# Patient Record
Sex: MALE | Race: WHITE | NOT HISPANIC OR LATINO | URBAN - METROPOLITAN AREA
[De-identification: names, ages, dates, MRNs, and addresses within clinical notes are randomized per-mention and may not be internally consistent; named-entity substitution may affect disease eponyms.]

---

## 2018-10-16 ENCOUNTER — HOSPITAL ENCOUNTER (EMERGENCY)
Facility: HOSPITAL | Age: 48
Discharge: HOME | End: 2018-10-16
Admitting: EMERGENCY MEDICINE

## 2018-10-16 ENCOUNTER — APPOINTMENT (OUTPATIENT)
Dept: CT IMAGING | Facility: HOSPITAL | Age: 48
End: 2018-10-16

## 2018-10-16 VITALS
SYSTOLIC BLOOD PRESSURE: 160 MMHG | BODY MASS INDEX: 41.99 KG/M2 | RESPIRATION RATE: 16 BRPM | WEIGHT: 252 LBS | HEIGHT: 65 IN | DIASTOLIC BLOOD PRESSURE: 102 MMHG | OXYGEN SATURATION: 98 % | HEART RATE: 75 BPM | TEMPERATURE: 97.9 F

## 2018-10-16 DIAGNOSIS — S09.90XA CLOSED HEAD INJURY, INITIAL ENCOUNTER: ICD-10-CM

## 2018-10-16 DIAGNOSIS — S00.01XA ABRASION, SCALP W/O INFECTION: ICD-10-CM
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
## 2018-10-16 PROCEDURE — 70450 CT HEAD/BRAIN W/O DYE: CPT

## 2018-10-16 PROCEDURE — 90715 TDAP VACCINE 7 YRS/> IM: CPT | Performed by: NURSE PRACTITIONER

## 2018-10-16 PROCEDURE — 90471 IMMUNIZATION ADMIN: CPT | Performed by: NURSE PRACTITIONER

## 2018-10-16 PROCEDURE — 25010000002 TDAP 5-2.5-18.5 LF-MCG/0.5 SUSPENSION: Performed by: NURSE PRACTITIONER

## 2018-10-16 PROCEDURE — 99284 EMERGENCY DEPT VISIT MOD MDM: CPT

## 2018-10-16 RX ORDER — ATENOLOL 25 MG/1
25 TABLET ORAL DAILY
COMMUNITY

## 2018-10-16 RX ORDER — PRAVASTATIN SODIUM 40 MG
40 TABLET ORAL DAILY
COMMUNITY

## 2018-10-16 RX ORDER — LISINOPRIL 20 MG/1
20 TABLET ORAL DAILY
COMMUNITY

## 2018-10-16 RX ADMIN — TETANUS TOXOID, REDUCED DIPHTHERIA TOXOID AND ACELLULAR PERTUSSIS VACCINE, ADSORBED 0.5 ML: 5; 2.5; 8; 8; 2.5 SUSPENSION INTRAMUSCULAR at 15:29

## 2018-10-16 NOTE — DISCHARGE INSTRUCTIONS
Home to rest  Drink plenty of fluids  Keep the wound clean and dry, apply bacitracin   Return if worse or new concerns   Continue care with your primary care physician and have your blood pressure regularly checked and managed. Normal blood pressure is 120/80.

## 2018-10-16 NOTE — ED TRIAGE NOTES
Pt to ED for fall backwards while unloading a truck, hit head on concrete. No LOC. Pt c/o dizziness and wound to occiptal area.

## 2018-10-16 NOTE — ED NOTES
Pts wounds cleansed with saline, antibiotic ointment and dressing applied. Requested Tdap requested form pharmacy.     Bethany Magana RN  10/16/18 7723

## 2018-10-16 NOTE — ED PROVIDER NOTES
EMERGENCY DEPARTMENT ENCOUNTER    CHIEF COMPLAINT  Chief Complaint: head injury  History given by: patient  History limited by: none  Room Number: 41/41  PMD: Provider, No Known      HPI:  Pt is a 48 y.o. male who presents complaining of head injury that occurred after a fall PTA.. Pt is also experiencing neck pain. Pt states that he is a  and was working on a strap when he fell backwards and hit his head on the rim of another truck. Pt denies LOC and is on 81 ASA. Pt is unaware TDAP status.     Duration:  Just PTA  Onset: sudden  Timing: constant  Location: posterior head  Radiation: none  Quality: pain  Intensity/Severity: moderate  Progression: unchanged  Associated Symptoms: neck pain  Aggravating Factors: none  Alleviating Factors: none  Previous Episodes: none  Treatment before arrival: Pt is on 81 ASA.     PAST MEDICAL HISTORY  Active Ambulatory Problems     Diagnosis Date Noted   • No Active Ambulatory Problems     Resolved Ambulatory Problems     Diagnosis Date Noted   • No Resolved Ambulatory Problems     Past Medical History:   Diagnosis Date   • Diabetes mellitus (CMS/HCC)    • Hyperlipidemia    • Hypertension        PAST SURGICAL HISTORY  Past Surgical History:   Procedure Laterality Date   • ADENOIDECTOMY     • CHOLECYSTECTOMY     • TONSILLECTOMY     • UVULECTOMY         FAMILY HISTORY  History reviewed. No pertinent family history.    SOCIAL HISTORY  Social History     Social History   • Marital status:      Spouse name: N/A   • Number of children: N/A   • Years of education: N/A     Occupational History   • Not on file.     Social History Main Topics   • Smoking status: Former Smoker     Years: 20.00   • Smokeless tobacco: Never Used   • Alcohol use No   • Drug use: Unknown   • Sexual activity: Not on file     Other Topics Concern   • Not on file     Social History Narrative   • No narrative on file       ALLERGIES  Patient has no known allergies.    REVIEW OF SYSTEMS  Review of  Systems   Constitutional: Negative for activity change, appetite change and fever.   HENT: Negative for congestion and sore throat.    Eyes: Negative.    Respiratory: Negative for cough and shortness of breath.    Cardiovascular: Negative for chest pain and leg swelling.   Gastrointestinal: Negative for abdominal pain, diarrhea and vomiting.   Endocrine: Negative.    Genitourinary: Negative for decreased urine volume and dysuria.   Musculoskeletal: Positive for neck pain.   Skin: Positive for wound (laceration-head injury). Negative for rash.   Allergic/Immunologic: Negative.    Neurological: Negative for weakness, numbness and headaches.        LOC (-)   Hematological: Negative.    Psychiatric/Behavioral: Negative.    All other systems reviewed and are negative.      PHYSICAL EXAM  ED Triage Vitals [10/16/18 1413]   Temp Heart Rate Resp BP SpO2   97.9 °F (36.6 °C) 65 18 (!) 160/102 100 %      Temp src Heart Rate Source Patient Position BP Location FiO2 (%)   Tympanic Monitor -- -- --       Physical Exam   Constitutional: He is oriented to person, place, and time. No distress.   HENT:   Head: Normocephalic.   Eyes: Pupils are equal, round, and reactive to light. EOM are normal.   Neck: Normal range of motion. Neck supple.   Cardiovascular: Normal rate, regular rhythm and normal heart sounds.    Pulmonary/Chest: Effort normal and breath sounds normal. No respiratory distress.   Abdominal: Soft. There is no tenderness. There is no rebound and no guarding.   Musculoskeletal: Normal range of motion. He exhibits no edema.        Cervical back: He exhibits no tenderness.   Neurological: He is alert and oriented to person, place, and time. He has normal sensation and normal strength.   Skin: Skin is warm and dry.   Abrasion to the occiput.    Psychiatric: Mood and affect normal.   Nursing note and vitals reviewed.    RADIOLOGY  CT Head Without Contrast   Final Result       No evidence for acute traumatic intracranial  pathology.       Radiation dose reduction techniques were utilized, including automated   exposure control and exposure modulation based on body size.       This report was finalized on 10/17/2018 8:15 AM by Dr. Hari Hood M.D.                 PROCEDURES  Procedures      PROGRESS AND CONSULTS  ED Course as of Oct 23 0835   Tue Oct 16, 2018   1557 Call from radiologist, Dr Hood Ct head negative    [JS]      ED Course User Index  [JS] Anila Wasserman APRN     1447-Ordered CT head for further evaluation and tdap vaccination for wound care.   1530- updated on Head CT. Discussed pt with Dr Carvajal who will see at bedside.  1550- pt resting. Updated on plan for discharge and local wound care. He states understanding.     MEDICAL DECISION MAKING  Results were reviewed/discussed with the patient and they were also made aware of online access. Pt also made aware that some labs, such as cultures, will not be resulted during ER visit and follow up with PMD is necessary.     MDM       DIAGNOSIS  Final diagnoses:   Fall from standing, initial encounter   Closed head injury, initial encounter   Abrasion, scalp w/o infection       DISPOSITION  Discharge     Latest Documented Vital Signs:  As of 8:35 AM  BP- (!) 160/102 HR- 75 Temp- 97.9 °F (36.6 °C) (Tympanic) O2 sat- 98%    --  Documentation assistance provided by marty Hicks for KARLO Santillan.  Information recorded by the scribe was done at my direction and has been verified and validated by me.     Naty Hicks  10/16/18 1541       Anila Wasserman APRN  10/23/18 0836       Anila Wasserman APRN  10/23/18 0837

## 2018-10-16 NOTE — ED PROVIDER NOTES
The MARIA A and I have discussed this patient's history, physical exam, and treatment plan. I have reviewed the documentation and personally had a face to face interaction with the patient  I affirm the documentation and agree with the treatment and plan.  The following describes my personal findings.    The patient presents complaining of a head injury secondary to a fall just PTA. 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 Pt c/o neck stiffness. Pt denies  weakness/numbness, upper extremity pain,  LOC and CP. Pt is not on blood thinner.     Patient is nontoxic appearing   Lungs/cardiovascular: lungs CTAB, heart RRR, chest nontender  Back/extremities: C/Ts/Ls  spine nontender, chest nontender  PERRL  Good sensation and  in upper extremities, PT/radial pulse intact B  Skin: 5cm oval-shaped abrasion to back of scalp covered with dressing post wound care in the ED.      Documentation assistance provided by marty Erickson.  Information recorded by the scribe was done at my direction and has been verified and validated by me.         Jelena Erickson  10/16/18 7018       Haritha Carvajal MD  10/21/18 2477

## 2021-08-04 ENCOUNTER — HOSPITAL ENCOUNTER (EMERGENCY)
Age: 51
Discharge: HOME | End: 2021-08-04
Payer: COMMERCIAL

## 2021-08-04 ENCOUNTER — APPOINTMENT (OUTPATIENT)
Dept: GENERAL RADIOLOGY | Age: 51
End: 2021-08-04
Payer: COMMERCIAL

## 2021-08-04 VITALS
HEIGHT: 65 IN | SYSTOLIC BLOOD PRESSURE: 163 MMHG | TEMPERATURE: 97.2 F | OXYGEN SATURATION: 97 % | HEART RATE: 75 BPM | BODY MASS INDEX: 43.34 KG/M2 | RESPIRATION RATE: 17 BRPM | WEIGHT: 260.14 LBS | DIASTOLIC BLOOD PRESSURE: 76 MMHG

## 2021-08-04 DIAGNOSIS — M25.512 ACUTE PAIN OF LEFT SHOULDER: Primary | ICD-10-CM
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
## 2021-08-04 PROCEDURE — 73030 X-RAY EXAM OF SHOULDER: CPT

## 2021-08-04 PROCEDURE — 99283 EMERGENCY DEPT VISIT LOW MDM: CPT

## 2021-08-04 RX ORDER — ACETAMINOPHEN 500 MG
500 TABLET ORAL EVERY 6 HOURS PRN
Qty: 60 TABLET | Refills: 0 | Status: SHIPPED | OUTPATIENT
Start: 2021-08-04

## 2021-08-04 RX ORDER — LIDOCAINE 50 MG/G
1 PATCH TOPICAL DAILY
Qty: 15 PATCH | Refills: 0 | Status: SHIPPED | OUTPATIENT
Start: 2021-08-04

## 2021-08-04 RX ORDER — IBUPROFEN 800 MG/1
800 TABLET ORAL EVERY 8 HOURS PRN
Qty: 30 TABLET | Refills: 0 | Status: SHIPPED | OUTPATIENT
Start: 2021-08-04

## 2021-08-04 ASSESSMENT — PAIN DESCRIPTION - DESCRIPTORS: DESCRIPTORS: ACHING

## 2021-08-04 ASSESSMENT — PAIN DESCRIPTION - ORIENTATION: ORIENTATION: LEFT

## 2021-08-04 ASSESSMENT — PAIN SCALES - GENERAL: PAINLEVEL_OUTOF10: 3

## 2021-08-04 ASSESSMENT — PAIN DESCRIPTION - FREQUENCY: FREQUENCY: CONTINUOUS

## 2021-08-04 ASSESSMENT — PAIN DESCRIPTION - PAIN TYPE: TYPE: ACUTE PAIN

## 2021-08-04 ASSESSMENT — PAIN DESCRIPTION - LOCATION: LOCATION: SHOULDER

## 2021-08-04 NOTE — ED TRIAGE NOTES
l shoulder pain starting on Friday after bag of insulation fell on head and shoulder. Pt denies any neck pain but admits to shoulder pain starting on Sunday. Pain with extending arm.

## 2021-08-04 NOTE — ED NOTES
Bed: E-42  Expected date:   Expected time:   Means of arrival:   Comments:  Shoulder pain cory Pham RN  08/04/21 3944

## 2021-08-04 NOTE — ED PROVIDER NOTES
[address]  [address] [address]  Dept: [phone]  Loc: [address] ENCOUNTER        This patient was not seen or evaluated by the attending physician. I evaluated this patient, the attending physician was available for consultation. CHIEF COMPLAINT    Chief Complaint   Patient presents with    Shoulder Pain     Pt c/o left shoulder pain after a 160 lb box fell on his shoulder at work. HPI    Veronica Ellison is a 46 y.o. male who presents with pain to the left shoulder. Onset was Friday. The duration has been constant since the onset. The context is that the injury occurred a few days ago, and it has been bothering him since. He was moving boxes of insulation, which weigh about 150-160lbs, when one fell from about 3ft above him onto his shoulder. Denies hitting his head or losing consciousness. Denies numbness/tingling/weakness. Is right hand dominant. The quality of the pain is sharp. The severity is 3/10. It is worse with movement, especially over his head. The patient has no other associated injury.      REVIEW OF SYSTEMS    Musculoskeletal: see HPI, no other joint or bony injury  Cardiac: No chest pain  Pulmonary: No difficulty breathing  Skin: No lacerations or puncture wounds  Neurologic: No loss of consciousness, no head injury, no paresthesias or focal distal extremity weakness    PAST MEDICAL & SURGICAL HISTORY    Past Medical History:   Diagnosis Date    Hyperlipidemia     Hypertension      Past Surgical History:   Procedure Laterality Date    GALLBLADDER SURGERY      NASAL SEPTUM SURGERY         CURRENT MEDICATIONS  (may include discharge medications prescribed in the ED)  Current Outpatient Rx   Medication Sig Dispense Refill    lidocaine (LIDODERM) 5 % Place 1 patch onto the skin daily 12 hours on, 12 hours off. *If these are not covered, may substitute for 4% patches or OTC* 15 patch 0    acetaminophen (AMINOFEN) 500 MG tablet Take 1 tablet by mouth every 6 hours as needed for Pain 60 tablet 0    ibuprofen (ADVIL;MOTRIN) 800 MG tablet Take 1 tablet by mouth every 8 hours as needed for Pain or Fever 30 tablet 0    metFORMIN (GLUCOPHAGE) 1000 MG tablet Take 1,000 mg by mouth 2 times daily (with meals)      ATENOLOL PO Take by mouth      pravastatin (PRAVACHOL) 40 MG tablet Take 40 mg by mouth daily      HYDROcodone-acetaminophen (NORCO) 5-325 MG per tablet Take 1-2 tablets by mouth every 6 hours as needed for Pain for 15 doses. Sedation precautions please 15 tablet 0    naproxen (NAPROSYN) 500 MG tablet Take 1 tablet by mouth 2 times daily for 10 days. 20 tablet 0       ALLERGIES    Allergies   Allergen Reactions    Amitriptyline Hcl Other (See Comments)     sts made me mean       SOCIAL & FAMILY HISTORY    Social History     Socioeconomic History    Marital status:      Spouse name: None    Number of children: None    Years of education: None    Highest education level: None   Occupational History    None   Tobacco Use    Smoking status: Never Smoker    Smokeless tobacco: Never Used   Substance and Sexual Activity    Alcohol use: No    Drug use: No    Sexual activity: None   Other Topics Concern    None   Social History Narrative    None     Social Determinants of Health     Financial Resource Strain:     Difficulty of Paying Living Expenses:    Food Insecurity:     Worried About Running Out of Food in the Last Year:     Ran Out of Food in the Last Year:    Transportation Needs:     Lack of Transportation (Medical):      Lack of Transportation (Non-Medical):    Physical Activity:     Days of Exercise per Week:     Minutes of Exercise per Session:    Stress:     Feeling of Stress :    Social Connections:     Frequency of Communication with Friends and Family:     Frequency of Social Gatherings with Friends and Family:     Attends Mu-ism Services:     Active Member of Clubs or Organizations:     Attends Club or Organization Meetings:     Marital Status:    Intimate Partner Violence:     Fear of Current or Ex-Partner:     Emotionally Abused:     Physically Abused:     Sexually Abused:      History reviewed. No pertinent family history. PHYSICAL EXAM    VITAL SIGNS: BP (!) 163/76   Pulse 75   Temp 97.2 °F (36.2 °C) (Oral)   Resp 17   Ht 5' 5\" (1.651 m)   Wt 260 lb 2.3 oz (118 kg)   SpO2 97%   BMI 43.29 kg/m²   Constitutional:  Well nourished, no acute distress  HENT:  Atraumatic, moist mucous membranes  NECK: normal range of motion/supple, no posterior midline neck tenderness  Respiratory: Speaking complete sentences without difficulty, no retractions  Vascular:  Bilateral radial pulses 2+, brisk capillary refill  Musculoskeletal:  Exam of the affected shoulder reveals mild tenderness to palpation anteriorly and posteriorly, no obvious deformity, passive and active range of motion intact, but with increased pain with abduction, internal rotation. No increased warmth or joint effusion. Equal  strength bilaterally. Integument:  Well hydrated, no skin lacerations   Neurologic:  Awake, alert, no slurred speech, axillary nerve is intact and the median/ulnar/radial nerve sensory and motor distributions are intact on the affected side, brachioradialis reflex 2+ bilaterally    RADIOLOGY   XR SHOULDER LEFT (MIN 2 VIEWS)   Preliminary Result   1. No acute osseous abnormality   2. Moderate AC joint osteoarthritis   3. Chronic rotator cuff pathology             PROCEDURES  none    ED COURSE & MEDICAL DECISION MAKING   See chart for medications given during emergency department course.     Differential diagnosis: includes but not limited to rotator cuff sprain/tear/rupture, clavicle fracture, humerus fracture, scapular fracture, posterior or anterior glenohumeral joint dislocation, glenohumeral subluxation, AC joint separation, brachioplexus injury or other peripheral nerve injury, cervical spine neurologic or mechanical bony injury, left apical pneumothorax, arterial injury/Ischemia, Infection, other    Plain films as above with no acute process - he does have moderate AC joint osteoarthritis, chronic rotator cuff pathology. I'll prescribe analgesics. If symptoms do not improve, patient will need further evaluation by orthopedics for possible rotator cuff tear - he is given referral to ortho. The patient was instructed to follow up as an outpatient in 3 days with orthopedics. The patient was instructed to return to the ED immediately for any new or worsening symptoms. The patient verbalized understanding. FINAL IMPRESSION    1. Acute pain of left shoulder    2.  Work related injury         PLAN  Discharge with outpatient follow-up (see EMR)    (Please note that this note was completed with a voice recognition program.  Every attempt was made to edit the dictations, but inevitably there remain words that are mis-transcribed.)        Malka Keys [place]  08/04/21 8592

## 2021-08-06 ENCOUNTER — OFFICE VISIT (OUTPATIENT)
Dept: ORTHOPEDIC SURGERY | Age: 51
End: 2021-08-06
Payer: COMMERCIAL

## 2021-08-06 VITALS — HEIGHT: 65 IN | WEIGHT: 260 LBS | RESPIRATION RATE: 16 BRPM | BODY MASS INDEX: 43.32 KG/M2

## 2021-08-06 DIAGNOSIS — S40.012A CONTUSION OF LEFT SHOULDER, INITIAL ENCOUNTER: ICD-10-CM

## 2021-08-06 DIAGNOSIS — M75.52 ACUTE BURSITIS OF LEFT SHOULDER: ICD-10-CM

## 2021-08-06 DIAGNOSIS — M25.512 LEFT SHOULDER PAIN, UNSPECIFIED CHRONICITY: Primary | ICD-10-CM

## 2021-08-06 PROCEDURE — 99203 OFFICE O/P NEW LOW 30 MIN: CPT | Performed by: PHYSICIAN ASSISTANT

## 2021-08-06 RX ORDER — LISINOPRIL 40 MG/1
TABLET ORAL
COMMUNITY
Start: 2021-05-15

## 2021-08-06 RX ORDER — METHYLPREDNISOLONE 4 MG/1
4 TABLET ORAL SEE ADMIN INSTRUCTIONS
Qty: 1 KIT | Refills: 0 | Status: SHIPPED | OUTPATIENT
Start: 2021-08-06 | End: 2021-08-12

## 2021-08-06 RX ORDER — ALPRAZOLAM 0.5 MG/1
TABLET ORAL
COMMUNITY
Start: 2021-05-10

## 2021-08-06 NOTE — LETTER
[facility]  [place] [address] [address] [address]  Phone: [phone]  Fax: [phone]    Aline Apley, [place]        August 6, 2021     Patient: Gamal Garrido   YOB: 1970   Date of Visit: 8/6/2021       To Whom It May Concern: It is my medical opinion that Parth Chau has a contusion/bursitis injury. This occurred at work on 7/30/2021.   At this point his work status will be light duty only for minimum of 1 to 2 weeks    If you have any questions or concerns, please don't hesitate to call  Sincerely,          Aline Apley, PA

## 2021-08-10 NOTE — PROGRESS NOTES
This dictation was done with [employer] dictation and may contain mechanical errors related to translation. I have today reviewed with Tori Rodriguez the clinically relevant, past medical history, medications, allergies, family history, social history, and Review Of Systems form the patients most recent history form & I have documented any details relevant to today's presenting complaints in my history below. Mr. Reji Tovar's self-reported past medical history, medications, allergies, family history, social history, and Review Of Systems form has been scanned into the chart under the \"Media\" tab. Subjective:  Tori Rodriguez is a 46 y.o. who is here complaining of pain in his left shoulder related to an injury that occurred on 7/30/2021. At the time he had a lot of excruciating pain on his neck and shoulder. He had 115 pounds fall on it went to the emergency department on 8/4/2021. He had x-rays done at that time. At the emergency department he was told to follow-up with orthopedics. Currently at rest he says he is 0 out of 10 pain. But he says he raises his arm or reaches behind him or overhead he has a lot of pain. He also has some pain that goes into the arm with motion activities and trying to sleep at night. He reviewed the x-rays from the emergency department and we also took a transaxillary view and a Y view at the office today. There is no problem list on file for this patient.           Current Outpatient Medications on File Prior to Visit   Medication Sig Dispense Refill    lidocaine (LIDODERM) 5 % Place 1 patch onto the skin daily 12 hours on, 12 hours off. *If these are not covered, may substitute for 4% patches or OTC* 15 patch 0    metFORMIN (GLUCOPHAGE) 1000 MG tablet Take 1,000 mg by mouth 2 times daily (with meals)      ATENOLOL PO Take by mouth      pravastatin (PRAVACHOL) 40 MG tablet Take 40 mg by mouth daily      ALPRAZolam (XANAX) 0.5 MG tablet TAKE 1 TABLET BY MOUTH EVERY DAY AS NEEDED      lisinopril (PRINIVIL;ZESTRIL) 40 MG tablet TAKE 1 TABLET BY MOUTH EVERY DAY      acetaminophen (AMINOFEN) 500 MG tablet Take 1 tablet by mouth every 6 hours as needed for Pain 60 tablet 0    ibuprofen (ADVIL;MOTRIN) 800 MG tablet Take 1 tablet by mouth every 8 hours as needed for Pain or Fever 30 tablet 0    HYDROcodone-acetaminophen (NORCO) 5-325 MG per tablet Take 1-2 tablets by mouth every 6 hours as needed for Pain for 15 doses. Sedation precautions please (Patient not taking: Reported on 8/6/2021) 15 tablet 0    naproxen (NAPROSYN) 500 MG tablet Take 1 tablet by mouth 2 times daily for 10 days. 20 tablet 0     No current facility-administered medications on file prior to visit. Objective:   Resp. rate 16, height 5' 5\" (1.651 m), weight 260 lb (117.9 kg). On examination is a very pleasant 28-year-old gentleman in no acute distress he is alert and oriented x3 he has good symmetric motion through the neck and a negative Spurling's test.  He has palpable tenderness over the trapezius. He has some pain with impingement and crossover testing. He is negative for sulcus sign negative for relocation test.  He has good  strength good wrist extension strength. While I have him abducted at 90 degrees and going into internal rotation I can feel some crepitus around the rotator cuff consistent with rotator cuff tendinitis or bursitis. This is also very painful for him. Neuro exam grossly intact both lower extremities. Intact sensation to light touch. Motor exam 4+ to 5/5 in all major motor groups. Negative Campos's sign. Skin is warm, dry and intact with out erythema or significant increased temperature around the knee joint(s). There are no cutaneous lesions or lymphadenopathy present.     X-RAYS:  X-rays taken the office today do show a downsloping acromion consistent with impingement syndrome there is no fractures there is no significant bone abnormalities in the glenohumeral joint or the acromioclavicular joint. Assessment:  Left shoulder contusion/bursitis of the rotator cuff    Plan:  During today's visit, there was approximately 35 minutes of face-to-face discussion in regards to the patient's current condition and treatment options. More than 50 % of the time was counseling and coordination of care as indicated above. At this point I will do a C9 to get approval for rotator cuff tendinitis bursitis and also to get approval for cortisone injection. In the short-term I will place him on a Medrol Dosepak which should help with the swelling temporarily.   Further treatment based on his response      PROCEDURE NOTE:   Medrol Dosepak      They will schedule a follow up in 2 weeks

## 2021-08-11 ENCOUNTER — TELEPHONE (OUTPATIENT)
Dept: ORTHOPEDIC SURGERY | Age: 51
End: 2021-08-11

## 2021-08-19 ENCOUNTER — OFFICE VISIT (OUTPATIENT)
Dept: ORTHOPEDIC SURGERY | Age: 51
End: 2021-08-19
Payer: COMMERCIAL

## 2021-08-19 VITALS — WEIGHT: 260 LBS | HEIGHT: 65 IN | BODY MASS INDEX: 43.32 KG/M2

## 2021-08-19 DIAGNOSIS — M25.512 LEFT SHOULDER PAIN, UNSPECIFIED CHRONICITY: Primary | ICD-10-CM

## 2021-08-19 DIAGNOSIS — S40.012A CONTUSION OF LEFT SHOULDER, INITIAL ENCOUNTER: ICD-10-CM

## 2021-08-19 PROCEDURE — 99213 OFFICE O/P EST LOW 20 MIN: CPT | Performed by: PHYSICIAN ASSISTANT

## 2021-08-19 NOTE — LETTER
[facility]  [address] [address]  Phone: [phone]  Fax: [phone]    Rolly Cr        August 19, 2021     Patient: Bud Henning   YOB: 1970   Date of Visit: 8/19/2021       To Whom It May Concern: It is my medical opinion that Iman Sahni may return to work on 8/20/2021 with no restrictions. If you have any questions or concerns, please don't hesitate to call.     Sincerely,          COLTEN Cr

## 2021-08-21 NOTE — PROGRESS NOTES
This dictation was done with [employer] dictation and may contain mechanical errors related to translation. I have today reviewed with Phil Mast the clinically relevant, past medical history, medications, allergies, family history, social history, and Review Of Systems form the patients most recent history form & I have documented any details relevant to today's presenting complaints in my history below. Mr. Colonel Aditya Tovar's self-reported past medical history, medications, allergies, family history, social history, and Review Of Systems form has been scanned into the chart under the \"Media\" tab. Subjective:  Phil Mast is a 46 y.o. who is here in follow-up for his left shoulder. This was originally her back in July at work. Placed him on light duty he is done some exercises and we gave him a strong anti-inflammatory and he shown a lot of improvement to where he is only a 1 out of 10 pain right now. There is no problem list on file for this patient.           Current Outpatient Medications on File Prior to Visit   Medication Sig Dispense Refill    ALPRAZolam (XANAX) 0.5 MG tablet TAKE 1 TABLET BY MOUTH EVERY DAY AS NEEDED      lisinopril (PRINIVIL;ZESTRIL) 40 MG tablet TAKE 1 TABLET BY MOUTH EVERY DAY      lidocaine (LIDODERM) 5 % Place 1 patch onto the skin daily 12 hours on, 12 hours off. *If these are not covered, may substitute for 4% patches or OTC* 15 patch 0    acetaminophen (AMINOFEN) 500 MG tablet Take 1 tablet by mouth every 6 hours as needed for Pain 60 tablet 0    ibuprofen (ADVIL;MOTRIN) 800 MG tablet Take 1 tablet by mouth every 8 hours as needed for Pain or Fever 30 tablet 0    metFORMIN (GLUCOPHAGE) 1000 MG tablet Take 1,000 mg by mouth 2 times daily (with meals)      ATENOLOL PO Take by mouth      pravastatin (PRAVACHOL) 40 MG tablet Take 40 mg by mouth daily      HYDROcodone-acetaminophen (NORCO) 5-325 MG per tablet Take 1-2 tablets by mouth every 6 hours as needed for Pain for 15 doses. Sedation precautions please 15 tablet 0    naproxen (NAPROSYN) 500 MG tablet Take 1 tablet by mouth 2 times daily for 10 days. 20 tablet 0     No current facility-administered medications on file prior to visit. Objective:   Height 5' 5\" (1.651 m), weight 260 lb (117.9 kg). On examination this is a pleasant 66-year-old gentleman in no acute distress is alert and oriented x3 he has 100 degrees of forward flexion 130 degrees of abduction. He has fair supraspinous strength testing he is negative for crossover test negative for impingement test negative for sulcus sign also good symmetric motion to the neck with a negative Spurling's test.  Neuro exam grossly intact both lower extremities. Intact sensation to light touch. Motor exam 4+ to 5/5 in all major motor groups. Negative Campos's sign. Skin is warm, dry and intact with out erythema or significant increased temperature around the knee joint(s). There are no cutaneous lesions or lymphadenopathy present. X-RAYS:  No x-rays today      Assessment:  Left shoulder strain    Plan:  During today's visit, there was approximately 20 minutes of face-to-face discussion in regards to the patient's current condition and treatment options. More than 50 % of the time was counseling and coordination of care as indicated above. At this point his shoulder feels stable and he has not been stressing it on light duty we will have him finish this out and on 36 I will allow him to return to work without restriction and further treatment will based on how that goes. Zoe understands needs to follow-up with us within the 6-month.       PROCEDURE NOTE:   Return to work on 8/20/2021      They will schedule a follow up in 6 months as needed

## 2022-03-28 ENCOUNTER — OFFICE VISIT (OUTPATIENT)
Dept: ORTHOPEDIC SURGERY | Age: 52
End: 2022-03-28
Payer: COMMERCIAL

## 2022-03-28 VITALS — HEIGHT: 65 IN | BODY MASS INDEX: 40.98 KG/M2 | WEIGHT: 246 LBS

## 2022-03-28 DIAGNOSIS — S40.012A CONTUSION OF LEFT SHOULDER, INITIAL ENCOUNTER: Primary | ICD-10-CM

## 2022-03-28 PROCEDURE — 99213 OFFICE O/P EST LOW 20 MIN: CPT | Performed by: ORTHOPAEDIC SURGERY

## 2022-03-28 NOTE — PROGRESS NOTES
[facility]    Chief Complaint: Follow-up for left shoulder strain    HPI:  Esau Higginbotham is a 45 y. o. who is here in follow-up for left shoulder strain. This is a work comp related injury. The date of injury September 30, 2021. He is right-hand dominant. He is currently back at work and is fully functional.  He reports no pain or range of motion restriction. He has no restrictions. He denies neck pain, numbness, tingling, radiating pain down the arm. There is no problem list on file for this patient. ROS:  Constitutional: denies fever, chills, weight loss  MSK: denies pain in other joints, muscle aches  Neurological: denies numbness, tingling, weakness    Exam:  Height 5' 5\" (1.651 m), weight 246 lb (111.6 kg). Appearance: sitting in exam room chair, appears to be in no acute distress, awake and alert  Resp: unlabored breathing on room air  Skin: warm, dry and intact with out erythema or significant increased temperature  LUE: Examination of the shoulder shows no gross defects. Forward flexion is 165 degrees, external rotation 45 degrees, internal rotation to lumbar spine. Sensation is intact light touch. Brisk capillary refill. 2+ radial pulses. Strength is 5/5 in all muscle groups. Imaging:  None    Assessment:  Left shoulder strain, rotator cuff    Plan:  He is doing well 6 months out from the time of injury. He will continue working full duty with no restriction. He will follow up in 6 months for repeat assessment or sooner if needed. Total time spent on today's encounter was at least 22 minutes.  This time included reviewing prior notes, radiographs, and lab results when available, reviewing history obtained by medical assistant, performing history and physical exam, reviewing tests/radiographs with the patient, counseling the patient, ordering medications or tests, documentation in the electronic health record, and coordination of care.    This dictation was done with Dragon dictation and may contain mechanical errors related to translation.